# Patient Record
Sex: MALE | Race: WHITE | HISPANIC OR LATINO | URBAN - METROPOLITAN AREA
[De-identification: names, ages, dates, MRNs, and addresses within clinical notes are randomized per-mention and may not be internally consistent; named-entity substitution may affect disease eponyms.]

---

## 2020-07-11 ENCOUNTER — OUTPATIENT (OUTPATIENT)
Dept: OUTPATIENT SERVICES | Facility: HOSPITAL | Age: 70
LOS: 1 days | Discharge: HOME | End: 2020-07-11

## 2020-07-11 DIAGNOSIS — Z11.59 ENCOUNTER FOR SCREENING FOR OTHER VIRAL DISEASES: ICD-10-CM

## 2020-07-14 ENCOUNTER — OUTPATIENT (OUTPATIENT)
Dept: OUTPATIENT SERVICES | Facility: HOSPITAL | Age: 70
LOS: 1 days | Discharge: HOME | End: 2020-07-14

## 2020-07-14 VITALS
HEART RATE: 38 BPM | HEIGHT: 69 IN | DIASTOLIC BLOOD PRESSURE: 78 MMHG | TEMPERATURE: 97 F | WEIGHT: 171.96 LBS | OXYGEN SATURATION: 98 % | RESPIRATION RATE: 17 BRPM | SYSTOLIC BLOOD PRESSURE: 177 MMHG

## 2020-07-14 VITALS — DIASTOLIC BLOOD PRESSURE: 58 MMHG | SYSTOLIC BLOOD PRESSURE: 122 MMHG | HEART RATE: 34 BPM | RESPIRATION RATE: 17 BRPM

## 2020-07-14 DIAGNOSIS — Z98.890 OTHER SPECIFIED POSTPROCEDURAL STATES: Chronic | ICD-10-CM

## 2020-07-17 DIAGNOSIS — H25.11 AGE-RELATED NUCLEAR CATARACT, RIGHT EYE: ICD-10-CM

## 2020-07-31 PROBLEM — R00.1 BRADYCARDIA, UNSPECIFIED: Chronic | Status: ACTIVE | Noted: 2020-07-14

## 2020-07-31 PROBLEM — Z86.39 PERSONAL HISTORY OF OTHER ENDOCRINE, NUTRITIONAL AND METABOLIC DISEASE: Chronic | Status: ACTIVE | Noted: 2020-07-14

## 2020-08-08 ENCOUNTER — OUTPATIENT (OUTPATIENT)
Dept: OUTPATIENT SERVICES | Facility: HOSPITAL | Age: 70
LOS: 1 days | Discharge: HOME | End: 2020-08-08

## 2020-08-08 DIAGNOSIS — Z11.59 ENCOUNTER FOR SCREENING FOR OTHER VIRAL DISEASES: ICD-10-CM

## 2020-08-08 DIAGNOSIS — Z98.890 OTHER SPECIFIED POSTPROCEDURAL STATES: Chronic | ICD-10-CM

## 2020-08-11 ENCOUNTER — OUTPATIENT (OUTPATIENT)
Dept: OUTPATIENT SERVICES | Facility: HOSPITAL | Age: 70
LOS: 1 days | Discharge: HOME | End: 2020-08-11

## 2020-08-11 VITALS — DIASTOLIC BLOOD PRESSURE: 68 MMHG | RESPIRATION RATE: 15 BRPM | HEART RATE: 36 BPM | SYSTOLIC BLOOD PRESSURE: 147 MMHG

## 2020-08-11 VITALS
RESPIRATION RATE: 15 BRPM | SYSTOLIC BLOOD PRESSURE: 121 MMHG | WEIGHT: 171.96 LBS | HEART RATE: 38 BPM | DIASTOLIC BLOOD PRESSURE: 66 MMHG | OXYGEN SATURATION: 98 % | HEIGHT: 70 IN | TEMPERATURE: 97 F

## 2020-08-11 DIAGNOSIS — Z98.890 OTHER SPECIFIED POSTPROCEDURAL STATES: Chronic | ICD-10-CM

## 2020-08-11 DIAGNOSIS — Z96.1 PRESENCE OF INTRAOCULAR LENS: Chronic | ICD-10-CM

## 2020-08-11 RX ORDER — ATORVASTATIN CALCIUM 80 MG/1
1 TABLET, FILM COATED ORAL
Qty: 0 | Refills: 0 | DISCHARGE

## 2020-08-11 NOTE — ASU PREOP CHECKLIST - INTERNAL PROSTHESES
yes(specify)/DENTAL IMPLANTS UPPER RIGHT X 2 yes(specify)/DENTAL IMPLANTS UPPER RIGHT X 2, RIGHT IOLI

## 2020-08-11 NOTE — PRE-ANESTHESIA EVALUATION ADULT - NSANTHPMHFT_GEN_ALL_CORE
Chart reviewed, Med evaluation seen, pt interviewed and examined. Labs seen. EKG Sinus Juan Carlos @ 39/min.  Pt had cardiac work-up all negative and no recommendation from Cardiologist as per patient.

## 2020-08-11 NOTE — ASU PATIENT PROFILE, ADULT - PSH
H/O colonoscopy  2016  History of intraocular lens implant  RIGHT EYE  History of surgery  Lasix to bi-lateral eyes

## 2020-08-13 DIAGNOSIS — H26.9 UNSPECIFIED CATARACT: ICD-10-CM

## 2020-08-13 DIAGNOSIS — H52.4 PRESBYOPIA: ICD-10-CM

## 2020-12-07 NOTE — ASU PATIENT PROFILE, ADULT - FALL HARM RISK
-- DO NOT REPLY / DO NOT REPLY ALL --  -- Message is from the [facility]--    COVID-19 Universal Screening: N/A - Not about scheduling    General Patient Message      Reason for Call: arline calling from [facility] and would like a call back has questions regarding last office visit    Caller Information       Type Contact Phone    12/07/2020 09:31 AM CST Phone (Incoming) arline [phone]          Alternative phone number: n    Turnaround time given to caller:   \"This message will be sent to [state Provider's name]. The clinical team will fulfill your request as soon as they review your message.\"     Anesthesia/other

## 2021-12-28 PROBLEM — Z00.00 ENCOUNTER FOR PREVENTIVE HEALTH EXAMINATION: Status: ACTIVE | Noted: 2021-12-28

## 2022-01-10 ENCOUNTER — LABORATORY RESULT (OUTPATIENT)
Age: 72
End: 2022-01-10

## 2022-01-10 ENCOUNTER — APPOINTMENT (OUTPATIENT)
Dept: CARDIOLOGY | Facility: CLINIC | Age: 72
End: 2022-01-10
Payer: MEDICARE

## 2022-01-10 VITALS
HEIGHT: 70 IN | DIASTOLIC BLOOD PRESSURE: 73 MMHG | BODY MASS INDEX: 25.05 KG/M2 | HEART RATE: 46 BPM | TEMPERATURE: 97.2 F | WEIGHT: 175 LBS | SYSTOLIC BLOOD PRESSURE: 139 MMHG

## 2022-01-10 DIAGNOSIS — Z82.49 FAMILY HISTORY OF ISCHEMIC HEART DISEASE AND OTHER DISEASES OF THE CIRCULATORY SYSTEM: ICD-10-CM

## 2022-01-10 DIAGNOSIS — Z78.9 OTHER SPECIFIED HEALTH STATUS: ICD-10-CM

## 2022-01-10 DIAGNOSIS — Z87.891 PERSONAL HISTORY OF NICOTINE DEPENDENCE: ICD-10-CM

## 2022-01-10 PROCEDURE — 99204 OFFICE O/P NEW MOD 45 MIN: CPT

## 2022-01-10 PROCEDURE — 93000 ELECTROCARDIOGRAM COMPLETE: CPT

## 2022-01-10 NOTE — DISCUSSION/SUMMARY
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 I answered all his questions to his satisfaction. Patient was pleased with the visit.\par \par Patient will follow with me in 5 months’ time. Please do not hesitate to contact me at [phone] if you have any further questions regarding this patient care.\par \par \par

## 2022-01-10 NOTE — HISTORY OF PRESENT ILLNESS
[FreeTextEntry1] : Referring Cardiologist: Dr. Adriano Miranda\par Referring Internist: Dr. Saeed Henry\par \par referred for slow HR\par running up to few months ago 2-3 miles per day; now runs once a week\par does cardio every day: jumping/squats; lifts weight\par EST 1/4/2022; \par He has no chest pain, no shortness of breath, no dyspnea on exertion, no orthopnea, no PND. He denies dizziness, lightheadedness and syncope. He has no exertional symptoms.\par He presents for evaluation.\par \par

## 2022-01-10 NOTE — REASON FOR VISIT
[Arrhythmia/ECG Abnorrmalities] : arrhythmia/ECG abnormalities [FreeTextEntry3] : Dr. Adriano Miranda - Dr. Saeed Henry

## 2022-01-10 NOTE — CARDIOLOGY SUMMARY
[de-identified] : (01/10/2022) / sinus bradycardia at 46 bpm, no significant ST/T abnormalities [de-identified] : (01/04/2022) / EST: 10min 35 sec reached 140 bpm (93% MPHR)

## 2022-01-24 ENCOUNTER — APPOINTMENT (OUTPATIENT)
Dept: CARDIOLOGY | Facility: CLINIC | Age: 72
End: 2022-01-24

## 2022-04-03 LAB
B BURGDOR IGG+IGM SER QL IB: NORMAL
T3RU NFR SERPL: 1 TBI
T4 FREE SERPL-MCNC: 1.2 NG/DL
T4 SERPL-MCNC: 5.7 UG/DL
TSH SERPL-ACNC: 4.39 UIU/ML

## 2022-04-18 ENCOUNTER — APPOINTMENT (OUTPATIENT)
Dept: UROLOGY | Facility: CLINIC | Age: 72
End: 2022-04-18
Payer: MEDICARE

## 2022-04-18 VITALS — HEIGHT: 68 IN | BODY MASS INDEX: 26.22 KG/M2 | WEIGHT: 173 LBS

## 2022-04-18 DIAGNOSIS — Z12.5 ENCOUNTER FOR SCREENING FOR MALIGNANT NEOPLASM OF PROSTATE: ICD-10-CM

## 2022-04-18 DIAGNOSIS — R35.1 NOCTURIA: ICD-10-CM

## 2022-04-18 PROCEDURE — 99203 OFFICE O/P NEW LOW 30 MIN: CPT

## 2022-04-18 NOTE — ADDENDUM
[FreeTextEntry1] : Patient's note was transcribed with the assistance of a medical scribe under the supervision of Dr. Coelho.\par I, Dr. Coelho, have reviewed the patient's chart and agree that it aligns with my medical decisions.\par Jacinto Faustin, our scribe, also served as a chaperone for physical examination purposes.\par \par \par

## 2022-04-18 NOTE — PHYSICAL EXAM
[General Appearance - Well Developed] : well developed [General Appearance - Well Nourished] : well nourished [Normal Appearance] : normal appearance [Well Groomed] : well groomed [General Appearance - In No Acute Distress] : no acute distress [Edema] : no peripheral edema [Respiration, Rhythm And Depth] : normal respiratory rhythm and effort [Exaggerated Use Of Accessory Muscles For Inspiration] : no accessory muscle use [Abdomen Tenderness] : non-tender [Abdomen Soft] : soft [Costovertebral Angle Tenderness] : no ~M costovertebral angle tenderness [Urethral Meatus] : meatus normal [Urinary Bladder Findings] : the bladder was normal on palpation [Scrotum] : the scrotum was normal [Testes Mass (___cm)] : there were no testicular masses [No Prostate Nodules] : no prostate nodules [Prostate Size ___ gm] : prostate size [unfilled] gm [Normal Station and Gait] : the gait and station were normal for the patient's age [] : no rash [No Focal Deficits] : no focal deficits [Oriented To Time, Place, And Person] : oriented to person, place, and time [Affect] : the affect was normal [Mood] : the mood was normal [Not Anxious] : not anxious [No Palpable Adenopathy] : no palpable adenopathy [FreeTextEntry1] : normal exam

## 2022-04-18 NOTE — ASSESSMENT
[FreeTextEntry1] : JESUS TELLEZ is a 71 year old male who presents for consultation for General Urological Check up,  exam normal. Nocturia 1x.\par \par Plan: \par continue f/u w PCP. PSA screening as per PCP\par Recommending conservative management of nocturia at this time with eliminating fluids two hours before sleep, voiding prior to bedtime.  However I explained to the patient that nocturia 1 time is not particularly significant and he agrees, not bothered by sx.\par f/u prn if the symptoms worsen.

## 2022-04-18 NOTE — HISTORY OF PRESENT ILLNESS
[FreeTextEntry1] : JESUS TELLEZ is a 71 year old male who presents for consultation for General Urological Check up. \par \par Pt reports he is urinating well. Pt states he wakes up to urinate once during the night. Denies any bothersome LUTS. Denies gross hematuria, dysuria or associated symptoms.  Was told to follow-up with urology due to his nocturia 1 time.\par \par Denies  PMH including previous kidney stones, recurrent UTIs. \par Family History: No  malignancies\par Social History: previously worked for [employer]. Former Social smoker. \par \par AUA SS=1\par Quality of life= delighted. \par \par 11/2021: \par PSA=1.16 \par UA: negative \par UCx= ngtd \par

## 2022-04-25 ENCOUNTER — APPOINTMENT (OUTPATIENT)
Dept: CARDIOLOGY | Facility: CLINIC | Age: 72
End: 2022-04-25
Payer: MEDICARE

## 2022-04-25 VITALS
TEMPERATURE: 97.3 F | HEIGHT: 68 IN | HEART RATE: 47 BPM | DIASTOLIC BLOOD PRESSURE: 72 MMHG | WEIGHT: 176 LBS | SYSTOLIC BLOOD PRESSURE: 123 MMHG | BODY MASS INDEX: 26.67 KG/M2

## 2022-04-25 PROCEDURE — 99214 OFFICE O/P EST MOD 30 MIN: CPT

## 2022-04-25 PROCEDURE — 93000 ELECTROCARDIOGRAM COMPLETE: CPT | Mod: 59

## 2022-04-25 RX ORDER — FLUTICASONE PROPIONATE 50 UG/1
50 SPRAY, METERED NASAL DAILY
Refills: 0 | Status: COMPLETED | COMMUNITY
End: 2022-04-25

## 2022-04-25 RX ORDER — HYDROCORTISONE 25 MG/G
2.5 OINTMENT TOPICAL
Qty: 80 | Refills: 0 | Status: COMPLETED | COMMUNITY
Start: 2021-11-17 | End: 2022-04-25

## 2022-04-25 RX ORDER — HYDROCORTISONE 25 MG/G
2.5 CREAM TOPICAL DAILY
Refills: 0 | Status: COMPLETED | COMMUNITY
End: 2022-04-25

## 2022-04-25 NOTE — CARDIOLOGY SUMMARY
[de-identified] : (4/25/2022) / ECG: sinus bradycardia at 49 bpm, isolated APCs, no significant ST/T abnormalities\par (01/10/2022) / ECG: sinus bradycardia at 46 bpm, no significant ST/T abnormalities [de-identified] : EXTENDED HOLTER RESULT SUMMARY:\par (see scanned report from tracings)\par Dates: 1/10/2022 to 1/20/2022\par Duration: 10 days\par Average heart rate (bpm): 43\par Range heart rate (bpm): \par Tachyarrhythmias: no Atrial Fibrillation, no sustained SVT, no sustained VT\par Bradyarrhythmias: no pauses, bradycardia at night caused by non-conducted Atrial Bigeminy\par PVC burden: <0.01%\par APC burden: 4.07%\par \par SUMMARY: frequent APCs, sinus bradycardia, longest pause at night 2.6 sec,  bradycardia at night caused by non-conducted Atrial Bigeminy\par  [de-identified] : (01/04/2022) / EST: 10min 35 sec reached 140 bpm (93% MPHR)

## 2022-04-25 NOTE — DISCUSSION/SUMMARY
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 No chronotropic incompetence. I recommend evaluation for GOPI. \par \par No indication for pacemaker at this time. I recommend yearly extended Holter. \par \par I educated patient to contact me in case of any symptoms.\par \par I discussed with patient plan of care in great details. I answered all his questions to his satisfaction. Patient was pleased with the visit.\par \par Patient will follow with me in 12 months’ time. Please do not hesitate to contact me at [phone] if you have any further questions regarding this patient care.\par \par \par

## 2023-04-24 ENCOUNTER — APPOINTMENT (OUTPATIENT)
Dept: ELECTROPHYSIOLOGY | Facility: CLINIC | Age: 73
End: 2023-04-24
Payer: MEDICARE

## 2023-04-24 VITALS
HEART RATE: 35 BPM | DIASTOLIC BLOOD PRESSURE: 80 MMHG | BODY MASS INDEX: 26.52 KG/M2 | WEIGHT: 175 LBS | TEMPERATURE: 97.1 F | SYSTOLIC BLOOD PRESSURE: 130 MMHG | HEIGHT: 68 IN | RESPIRATION RATE: 14 BRPM

## 2023-04-24 PROCEDURE — 93000 ELECTROCARDIOGRAM COMPLETE: CPT

## 2023-04-24 PROCEDURE — 99214 OFFICE O/P EST MOD 30 MIN: CPT | Mod: 25

## 2023-04-24 NOTE — PHYSICAL EXAM

## 2023-05-22 ENCOUNTER — APPOINTMENT (OUTPATIENT)
Dept: ELECTROPHYSIOLOGY | Facility: CLINIC | Age: 73
End: 2023-05-22
Payer: MEDICARE

## 2023-05-22 VITALS
DIASTOLIC BLOOD PRESSURE: 69 MMHG | WEIGHT: 175 LBS | HEART RATE: 37 BPM | BODY MASS INDEX: 25.92 KG/M2 | SYSTOLIC BLOOD PRESSURE: 118 MMHG | TEMPERATURE: 98 F | HEIGHT: 69 IN

## 2023-05-22 DIAGNOSIS — E78.00 PURE HYPERCHOLESTEROLEMIA, UNSPECIFIED: ICD-10-CM

## 2023-05-22 PROCEDURE — 99214 OFFICE O/P EST MOD 30 MIN: CPT | Mod: 25

## 2023-05-22 PROCEDURE — 93000 ELECTROCARDIOGRAM COMPLETE: CPT

## 2023-05-22 RX ORDER — TRIAMCINOLONE ACETONIDE 0.25 MG/G
0.03 OINTMENT TOPICAL
Qty: 75 | Refills: 0 | Status: COMPLETED | COMMUNITY
Start: 2022-11-29 | End: 2023-05-22

## 2023-06-23 PROBLEM — E78.00 HIGH CHOLESTEROL: Status: ACTIVE | Noted: 2022-01-10

## 2023-06-23 NOTE — HISTORY OF PRESENT ILLNESS
[FreeTextEntry1] : Referring Cardiologist: Dr. Adriano Miranda\par Referring Internist: Dr. Saeed Henry\par \par referred for slow HR\par running up to few months ago 2-3 miles per day; now runs once a week\par does cardio every day: jumping/squats; lifts weight\par EST 1/4/2022; \par 4/25/2023: remains bradycardic. No symptoms. He has no chest pain, no shortness of breath, no dyspnea on exertion, no orthopnea, no PND. He denies dizziness, lightheadedness and syncope. He has no exertional symptoms.\par \par \par

## 2023-06-23 NOTE — DISCUSSION/SUMMARY
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 No chronotropic incompetence.\par \par No indication for pacemaker at this time. will do yearly extended monitoring \par Ordered 2 weeks of MCOT - On EKG today showed HR of 35bpm-\par I educated patient to contact me in case of any symptoms.\par \par I discussed with patient plan of care in great details. I answered all his questions to his satisfaction. Patient was pleased with the visit.\par \par Patient will follow with me in 1-2 months’ time. Please do not hesitate to contact me at [phone] if you have any further questions regarding this patient care.\par \par \par  [EKG obtained to assist in diagnosis and management of assessed problem(s)] : EKG obtained to assist in diagnosis and management of assessed problem(s)

## 2023-06-23 NOTE — CARDIOLOGY SUMMARY
[de-identified] : (05/22/2023) / ECG: sinus bradycardia at 37 bpm, no significant ST/T abnormalities\par (04/24/2023) / ECG: sinus bradycardia at 35 bpm, no significant ST/T abnormalities\par (4/25/2022) / ECG: sinus bradycardia at 49 bpm, isolated APCs, no significant ST/T abnormalities\par (01/10/2022) / ECG: sinus bradycardia at 46 bpm, no significant ST/T abnormalities [de-identified] : (5/10/2023) MCOT 2 weeks: average HR 46 bpm. longest pause 2.5 sec. no patient triggered symptoms.\par \par EXTENDED HOLTER RESULT SUMMARY:\par (see scanned report from tracings)\par Dates: 1/10/2022 to 1/20/2022\par Duration: 10 days\par Average heart rate (bpm): 43\par Range heart rate (bpm): \par Tachyarrhythmias: no Atrial Fibrillation, no sustained SVT, no sustained VT\par Bradyarrhythmias: no pauses, bradycardia at night caused by non-conducted Atrial Bigeminy\par PVC burden: <0.01%\par APC burden: 4.07%\par \par SUMMARY: frequent APCs, sinus bradycardia, longest pause at night 2.6 sec,  bradycardia at night caused by non-conducted Atrial Bigeminy\par  [de-identified] : (01/04/2022) / EST: 10min 35 sec reached 140 bpm (93% MPHR)

## 2023-06-23 NOTE — DISCUSSION/SUMMARY
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 No chronotropic incompetence.\par \par No indication for pacemaker at this time. will do yearly extended monitoring \par \par I reviewed with patient the results of MCOT 2 weeks: (5/10/2023) MCOT 2 weeks: average HR 46 bpm. longest pause 2.5 sec. no patient triggered symptoms. \par \par I educated patient to contact me in case of any symptoms.\par \par I discussed with patient plan of care in great details. I answered all his questions to his satisfaction. Patient was pleased with the visit.\par \par Patient will follow with me in 12 months’ time. Please do not hesitate to contact me at [phone] if you have any further questions regarding this patient care. [EKG obtained to assist in diagnosis and management of assessed problem(s)] : EKG obtained to assist in diagnosis and management of assessed problem(s)

## 2023-06-23 NOTE — CARDIOLOGY SUMMARY
[de-identified] : (04/24/2023) / ECG: sinus bradycardia at 35 bpm, no significant ST/T abnormalities\par (4/25/2022) / ECG: sinus bradycardia at 49 bpm, isolated APCs, no significant ST/T abnormalities\par (01/10/2022) / ECG: sinus bradycardia at 46 bpm, no significant ST/T abnormalities [de-identified] : EXTENDED HOLTER RESULT SUMMARY:\par (see scanned report from tracings)\par Dates: 1/10/2022 to 1/20/2022\par Duration: 10 days\par Average heart rate (bpm): 43\par Range heart rate (bpm): \par Tachyarrhythmias: no Atrial Fibrillation, no sustained SVT, no sustained VT\par Bradyarrhythmias: no pauses, bradycardia at night caused by non-conducted Atrial Bigeminy\par PVC burden: <0.01%\par APC burden: 4.07%\par \par SUMMARY: frequent APCs, sinus bradycardia, longest pause at night 2.6 sec,  bradycardia at night caused by non-conducted Atrial Bigeminy\par  [de-identified] : (01/04/2022) / EST: 10min 35 sec reached 140 bpm (93% MPHR)

## 2023-06-23 NOTE — HISTORY OF PRESENT ILLNESS
[FreeTextEntry1] : Referring Cardiologist: Dr. Adriano Miranda\par Referring Internist: Dr. Saeed Henry\par \par referred for slow HR\par running up to few months ago 2-3 miles per day; now runs once a week\par does cardio every day: jumping/squats; lifts weight\par EST 1/4/2022; \par 4/25/2023: remains bradycardic. given EXtended Holter\par 5/22/2023: Holter showed average HR 46 bpm; longest pause 2.5 secods. No symptoms. He has no chest pain, no shortness of breath, no dyspnea on exertion, no orthopnea, no PND. He denies dizziness, lightheadedness and syncope. He has no exertional symptoms.\par \par \par

## 2024-04-22 ENCOUNTER — APPOINTMENT (OUTPATIENT)
Dept: ELECTROPHYSIOLOGY | Facility: CLINIC | Age: 74
End: 2024-04-22
Payer: MEDICARE

## 2024-04-22 VITALS
SYSTOLIC BLOOD PRESSURE: 140 MMHG | WEIGHT: 175 LBS | TEMPERATURE: 97.1 F | HEART RATE: 35 BPM | DIASTOLIC BLOOD PRESSURE: 80 MMHG | BODY MASS INDEX: 25.92 KG/M2 | HEIGHT: 69 IN

## 2024-04-22 DIAGNOSIS — I49.1 ATRIAL PREMATURE DEPOLARIZATION: ICD-10-CM

## 2024-04-22 DIAGNOSIS — R00.1 BRADYCARDIA, UNSPECIFIED: ICD-10-CM

## 2024-04-22 PROCEDURE — 93000 ELECTROCARDIOGRAM COMPLETE: CPT

## 2024-04-22 PROCEDURE — 99214 OFFICE O/P EST MOD 30 MIN: CPT | Mod: 25

## 2024-04-22 RX ORDER — ATORVASTATIN CALCIUM 20 MG/1
20 TABLET, FILM COATED ORAL
Refills: 0 | Status: ACTIVE | COMMUNITY

## 2024-04-22 RX ORDER — PSYLLIUM HUSK 0.4 G
CAPSULE ORAL DAILY
Refills: 0 | Status: ACTIVE | COMMUNITY

## 2024-04-22 NOTE — DISCUSSION/SUMMARY
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 No chronotropic incompetence.  No indication for pacemaker at this time.  I recommend yearly extended monitoring; will do 7 days extended Holter.   I reviewed with patient the results of MCOT 2 weeks: (5/10/2023) MCOT 2 weeks: average HR 46 bpm. longest pause 2.5 sec. no patient triggered symptoms.   I educated patient to contact me in case of any symptoms.  I discussed with patient plan of care in great details. I answered all his questions to his satisfaction. Patient was pleased with the visit.  Patient will follow with me in 12 months' time. Please do not hesitate to contact me at [phone] if you have any further questions regarding this patient care. [EKG obtained to assist in diagnosis and management of assessed problem(s)] : EKG obtained to assist in diagnosis and management of assessed problem(s)

## 2024-04-22 NOTE — HISTORY OF PRESENT ILLNESS
[FreeTextEntry1] : Referring Cardiologist: Dr. Adriano Miranda Referring Internist: Dr. Saeed Henry  referred for slow HR running up to few months ago 2-3 miles per day; now runs once a week does cardio every day: jumping/squats; lifts weight EST 1/4/2022;  4/25/2023: remains bradycardic. given EXtended Holter 5/22/2023: Holter showed average HR 46 bpm; longest pause 2.5 secods. No symptoms.  4/22/2024: HR 35 bpm, no symptoms. running twice per week ~ 30 minutes - 3 miles. He has no chest pain, no shortness of breath, no dyspnea on exertion, no orthopnea, no PND. He denies dizziness, lightheadedness and syncope. He has no exertional symptoms.

## 2024-04-22 NOTE — CARDIOLOGY SUMMARY
[de-identified] : (04/22/2024) / ECG: sinus bradycardia at 35 bpm, no significant ST/T abnormalities (05/22/2023) / ECG: sinus bradycardia at 37 bpm, no significant ST/T abnormalities (04/24/2023) / ECG: sinus bradycardia at 35 bpm, no significant ST/T abnormalities (4/25/2022) / ECG: sinus bradycardia at 49 bpm, isolated APCs, no significant ST/T abnormalities (01/10/2022) / ECG: sinus bradycardia at 46 bpm, no significant ST/T abnormalities [de-identified] : (5/10/2023) MCOT 2 weeks: average HR 46 bpm. longest pause 2.5 sec. no patient triggered symptoms.  EXTENDED HOLTER RESULT SUMMARY: (see scanned report from tracings) Dates: 1/10/2022 to 1/20/2022 Duration: 10 days Average heart rate (bpm): 43 Range heart rate (bpm):  Tachyarrhythmias: no Atrial Fibrillation, no sustained SVT, no sustained VT Bradyarrhythmias: no pauses, bradycardia at night caused by non-conducted Atrial Bigeminy PVC burden: <0.01% APC burden: 4.07%  SUMMARY: frequent APCs, sinus bradycardia, longest pause at night 2.6 sec,  bradycardia at night caused by non-conducted Atrial Bigeminy  [de-identified] : (01/04/2022) / EST: 10min 35 sec reached 140 bpm (93% MPHR)

## 2025-04-21 ENCOUNTER — APPOINTMENT (OUTPATIENT)
Dept: ELECTROPHYSIOLOGY | Facility: CLINIC | Age: 75
End: 2025-04-21